# Patient Record
Sex: MALE
[De-identification: names, ages, dates, MRNs, and addresses within clinical notes are randomized per-mention and may not be internally consistent; named-entity substitution may affect disease eponyms.]

---

## 2018-03-20 ENCOUNTER — HOSPITAL ENCOUNTER (EMERGENCY)
Dept: HOSPITAL 25 - UCCORT | Age: 33
Discharge: HOME | End: 2018-03-20
Payer: SELF-PAY

## 2018-03-20 VITALS — DIASTOLIC BLOOD PRESSURE: 68 MMHG | SYSTOLIC BLOOD PRESSURE: 122 MMHG

## 2018-03-20 DIAGNOSIS — H66.92: Primary | ICD-10-CM

## 2018-03-20 PROCEDURE — 99202 OFFICE O/P NEW SF 15 MIN: CPT

## 2018-03-20 PROCEDURE — G0463 HOSPITAL OUTPT CLINIC VISIT: HCPCS

## 2018-03-20 NOTE — UC
Ear Complaint HPI





- HPI Summary


HPI Summary: 





LEFT EAR PAIN X 2 DAYS


+ COLD SYMPTOMS, COUGH , CHEST CONGESTION , FEVER


BODY ACHES , + NASAL CONGESTION, SORE THROAT 





- History of Current Complaint


Chief Complaint: UCGeneralIllness


Stated Complaint: COUGH,SINUS COMPLAINT


Time Seen by Provider: 03/20/18 10:35


Hx Obtained From: Patient


Onset/Duration: Gradual Onset, Lasting Days - 1


Severity Currently: Moderate


Pain Intensity: 0


Aggravating Factors: Cold


Alleviating Factors: Nothing


Associated Signs/Symptoms: Positive: URI Symptoms.  Negative: Hearing Loss, 

Foreign Body Sensation, Trauma to Ear





- Allergies/Home Medications


Allergies/Adverse Reactions: 


 Allergies











Allergy/AdvReac Type Severity Reaction Status Date / Time


 


No Known Allergies Allergy   Verified 03/20/18 10:41











Home Medications: 


 Home Medications





Naproxen Sodium [Aleve] 220 mg PO DAILY WITH MEAL 03/20/18 [History Confirmed 03 /20/18]











PMH/Surg Hx/FS Hx/Imm Hx


Previously Healthy: Yes





- Surgical History


Surgical History: Yes


Surgery Procedure, Year, and Place: ear operations - scar tissue removal





- Family History


Known Family History: 


   Negative: Diabetes





- Social History


Alcohol Use: None


Substance Use Type: None


Smoking Status (MU): Never Smoked Tobacco





Review of Systems


Constitutional: Negative


Skin: Negative


Eyes: Negative


ENT: Sore Throat, Ear Ache, Nasal Discharge


Respiratory: Cough


Cardiovascular: Negative


Is Patient Immunocompromised?: No


All Other Systems Reviewed And Are Negative: Yes





Physical Exam


Triage Information Reviewed: Yes


Appearance: Well-Appearing, No Pain Distress, Well-Nourished


Vital Signs: 


 Initial Vital Signs











Temp  98.4 F   03/20/18 10:34


 


Pulse  86   03/20/18 10:34


 


Resp  18   03/20/18 10:34


 


BP  122/68   03/20/18 10:34


 


Pulse Ox  100   03/20/18 10:34











Vital Signs Reviewed: Yes


Eye Exam: Normal


Eyes: Positive: Conjunctiva Clear


ENT: Positive: Normal ENT inspection, Hearing grossly normal, Pharynx normal, 

Nasal drainage, TM bulging - LEFT EAR, TM dull - LEFT EAR, TM red - LEFT EAR


Neck: Positive: Supple, Nontender, No Lymphadenopathy


Respiratory: Positive: Chest non-tender, Lungs clear, Normal breath sounds


Cardiovascular: Positive: RRR, No Murmur, Pulses Normal





Ear Complaint Course/Dx





- Differential Dx/Diagnosis


Provider Diagnoses: OTITIS MEDIA





Discharge





- Sign-Out/Discharge


Documenting (check all that apply): Discharge





- Discharge Plan


Condition: Stable


Disposition: HOME


Prescriptions: 


Amoxicillin PO (*) [Amoxicillin 875 MG (*)] 875 mg PO BID #20 tab


Patient Education Materials:  Ear Infection (ED)


Referrals: 


Non Staff,Doctor [Primary Care Provider] - 7 Days





- Billing Disposition and Condition


Condition: STABLE


Disposition: HOME